# Patient Record
Sex: MALE | Race: ASIAN | NOT HISPANIC OR LATINO
[De-identification: names, ages, dates, MRNs, and addresses within clinical notes are randomized per-mention and may not be internally consistent; named-entity substitution may affect disease eponyms.]

---

## 2019-04-11 VITALS — HEIGHT: 63 IN

## 2019-04-11 DIAGNOSIS — N18.6 END STAGE RENAL DISEASE: ICD-10-CM

## 2019-04-11 DIAGNOSIS — T82.599A OTHER MECHANICAL COMPLICATION OF UNSPECIFIED CARDIAC AND VASCULAR DEVICES AND IMPLANTS, INITIAL ENCOUNTER: ICD-10-CM

## 2019-04-11 DIAGNOSIS — I10 ESSENTIAL (PRIMARY) HYPERTENSION: ICD-10-CM

## 2019-04-11 PROBLEM — Z00.00 ENCOUNTER FOR PREVENTIVE HEALTH EXAMINATION: Status: ACTIVE | Noted: 2019-04-11

## 2019-04-11 NOTE — ASSESSMENT
[FreeTextEntry1] : 49yoM w/ESRD on HD via L radiocephalic AVF, reports prolonged bleeding after HD sessions.  Will plan for LUE fistulogram/venoplasty in OR.

## 2019-04-11 NOTE — PHYSICAL EXAM
[JVD] : no jugular venous distention  [Normal Breath Sounds] : Normal breath sounds [Normal Thyroid] : the thyroid was normal [Normal Rate and Rhythm] : normal rate and rhythm [2+] : left 2+ [Alert] : alert [No Rash or Lesion] : No rash or lesion [Calm] : calm [FreeTextEntry1] : +thrill/bruit in LUE [de-identified] : NC/AT, anicteric [de-identified] : Well-nourished, NAD [de-identified] : FROM throughout, strength 5/5x4, no palpable cords in UEs

## 2019-04-11 NOTE — HISTORY OF PRESENT ILLNESS
[FreeTextEntry1] : 49yoM w/ESRD on HD via L radiocephalic AVF referred by Dr. Lino Cabezas for recent complaints of bleeding from his AVF needle sites.  Pt denies any issues during HD sessions, no other complaints w/the LUE.

## 2019-04-12 ENCOUNTER — APPOINTMENT (OUTPATIENT)
Dept: VASCULAR SURGERY | Facility: CLINIC | Age: 49
End: 2019-04-12

## 2019-04-12 ENCOUNTER — OUTPATIENT (OUTPATIENT)
Dept: OUTPATIENT SERVICES | Facility: HOSPITAL | Age: 49
LOS: 1 days | End: 2019-04-12
Payer: MEDICARE

## 2019-04-12 VITALS
OXYGEN SATURATION: 99 % | DIASTOLIC BLOOD PRESSURE: 59 MMHG | HEART RATE: 81 BPM | TEMPERATURE: 96 F | HEIGHT: 63 IN | RESPIRATION RATE: 16 BRPM | WEIGHT: 117.07 LBS | SYSTOLIC BLOOD PRESSURE: 117 MMHG

## 2019-04-12 DIAGNOSIS — Z41.9 ENCOUNTER FOR PROCEDURE FOR PURPOSES OTHER THAN REMEDYING HEALTH STATE, UNSPECIFIED: Chronic | ICD-10-CM

## 2019-04-12 DIAGNOSIS — N18.6 END STAGE RENAL DISEASE: ICD-10-CM

## 2019-04-12 LAB
BASE EXCESS BLDA CALC-SCNC: 6.4 MMOL/L — HIGH (ref -2–3)
CA-I BLDA-SCNC: 1.13 MMOL/L — SIGNIFICANT CHANGE UP (ref 1.12–1.3)
COHGB MFR BLDA: 5 % — HIGH
HCO3 BLDA-SCNC: 32 MMOL/L — HIGH (ref 21–28)
HGB BLDA-MCNC: 10.4 G/DL — LOW (ref 13–17)
METHGB MFR BLDA: 0.6 % — SIGNIFICANT CHANGE UP
O2 CT VFR BLDA CALC: SIGNIFICANT CHANGE UP (ref 15–23)
OXYHGB MFR BLDA: 47 % — LOW (ref 94–100)
PCO2 BLDA: 54 MMHG — HIGH (ref 35–48)
PH BLDA: 7.4 — SIGNIFICANT CHANGE UP (ref 7.35–7.45)
PO2 BLDA: 29 MMHG — CRITICAL LOW (ref 83–108)
POTASSIUM BLDA-SCNC: 5 MMOL/L — HIGH (ref 3.5–4.9)
SAO2 % BLDA: 50 % — LOW (ref 95–100)
SODIUM BLDA-SCNC: 139 MMOL/L — SIGNIFICANT CHANGE UP (ref 138–146)

## 2019-04-12 PROCEDURE — 84295 ASSAY OF SERUM SODIUM: CPT

## 2019-04-12 PROCEDURE — 84132 ASSAY OF SERUM POTASSIUM: CPT

## 2019-04-12 PROCEDURE — 85018 HEMOGLOBIN: CPT

## 2019-04-12 PROCEDURE — 82330 ASSAY OF CALCIUM: CPT

## 2019-04-19 ENCOUNTER — OUTPATIENT (OUTPATIENT)
Dept: INPATIENT UNIT | Facility: HOSPITAL | Age: 49
LOS: 1 days | Discharge: HOME | End: 2019-04-19
Payer: MEDICARE

## 2019-04-19 ENCOUNTER — APPOINTMENT (OUTPATIENT)
Dept: VASCULAR SURGERY | Facility: HOSPITAL | Age: 49
End: 2019-04-19

## 2019-04-19 VITALS
OXYGEN SATURATION: 97 % | WEIGHT: 115.52 LBS | HEIGHT: 67 IN | DIASTOLIC BLOOD PRESSURE: 67 MMHG | TEMPERATURE: 97 F | RESPIRATION RATE: 16 BRPM | SYSTOLIC BLOOD PRESSURE: 121 MMHG | HEART RATE: 75 BPM

## 2019-04-19 VITALS
TEMPERATURE: 98 F | DIASTOLIC BLOOD PRESSURE: 88 MMHG | OXYGEN SATURATION: 99 % | RESPIRATION RATE: 12 BRPM | SYSTOLIC BLOOD PRESSURE: 155 MMHG | HEART RATE: 76 BPM

## 2019-04-19 DIAGNOSIS — Z41.9 ENCOUNTER FOR PROCEDURE FOR PURPOSES OTHER THAN REMEDYING HEALTH STATE, UNSPECIFIED: Chronic | ICD-10-CM

## 2019-04-19 LAB — POTASSIUM BLDV-SCNC: 4.6 MMOL/L — SIGNIFICANT CHANGE UP (ref 3.5–4.9)

## 2019-04-19 PROCEDURE — 84132 ASSAY OF SERUM POTASSIUM: CPT

## 2019-04-19 PROCEDURE — C1769: CPT

## 2019-04-19 PROCEDURE — 76000 FLUOROSCOPY <1 HR PHYS/QHP: CPT

## 2019-04-19 PROCEDURE — 36906 THRMBC/NFS DIALYSIS CIRCUIT: CPT

## 2019-04-19 PROCEDURE — C1725: CPT

## 2019-04-19 PROCEDURE — C1894: CPT

## 2019-04-19 PROCEDURE — 36902 INTRO CATH DIALYSIS CIRCUIT: CPT | Mod: GC

## 2019-04-19 PROCEDURE — C1887: CPT

## 2019-04-19 RX ORDER — LABETALOL HCL 100 MG
10 TABLET ORAL ONCE
Qty: 0 | Refills: 0 | Status: DISCONTINUED | OUTPATIENT
Start: 2019-04-19 | End: 2019-04-19

## 2019-04-19 RX ORDER — ACETAMINOPHEN 500 MG
650 TABLET ORAL ONCE
Qty: 0 | Refills: 0 | Status: DISCONTINUED | OUTPATIENT
Start: 2019-04-19 | End: 2019-04-26

## 2019-04-19 NOTE — BRIEF OPERATIVE NOTE - OPERATION/FINDINGS
LUE radiocephalic AVF with hx of excessive bleeding with HD 2/2 presumed central stenosis. Fistulogram revealing central stenosis and several points of stenosis within previously placed stents. Balloon fistuloplasty with 8x80 and 10x40 with improved flow on completion venogram. Improved thrill, hemostasis achieved, and palpable radial pulse at end of case.

## 2019-04-19 NOTE — PACU DISCHARGE NOTE - COMMENTS
Pt received dc instructions in Cantonese by Jeana [phone] via translation phone. Vss. Wife at bs to take pt home. Pt through teach back with the [employer]  verbalized instructions. Pt ate muffins and juice. Tolerated well.

## 2019-04-24 DIAGNOSIS — T82.868A THROMBOSIS DUE TO VASCULAR PROSTHETIC DEVICES, IMPLANTS AND GRAFTS, INITIAL ENCOUNTER: ICD-10-CM

## 2019-04-24 DIAGNOSIS — N18.6 END STAGE RENAL DISEASE: ICD-10-CM

## 2019-04-24 DIAGNOSIS — Z86.19 PERSONAL HISTORY OF OTHER INFECTIOUS AND PARASITIC DISEASES: ICD-10-CM
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
## 2019-04-24 DIAGNOSIS — F17.200 NICOTINE DEPENDENCE, UNSPECIFIED, UNCOMPLICATED: ICD-10-CM

## 2019-04-24 DIAGNOSIS — I12.0 HYPERTENSIVE CHRONIC KIDNEY DISEASE WITH STAGE 5 CHRONIC KIDNEY DISEASE OR END STAGE RENAL DISEASE: ICD-10-CM

## 2019-04-26 PROBLEM — M10.9 GOUT, UNSPECIFIED: Chronic | Status: ACTIVE | Noted: 2019-04-11

## 2019-04-26 PROBLEM — I10 ESSENTIAL (PRIMARY) HYPERTENSION: Chronic | Status: ACTIVE | Noted: 2019-04-11

## 2019-04-26 PROBLEM — N18.6 END STAGE RENAL DISEASE: Chronic | Status: ACTIVE | Noted: 2019-04-11

## 2020-02-19 NOTE — PATIENT PROFILE ADULT - NSPROGENARRIVEDFROM_GEN_A_NUR
CERTIFICATE OF SCHOOL and WORK    February 19, 2020      Re: Rigo Tanner  [address] [facility]  [address] [address]      This is to certify that Rigo Tanner has been under my care from 2/19/2020 and is excused from work and school 2/18/2020 thru 2/20/2020. May return to work and school on 2/21/2020. SIGNATURE:___________________________________________,   2/19/2020  Ryan Pacheco PA-C        [place] [address] [address]  [facility] [place]  [phone]
home

## 2023-06-28 NOTE — ASU PATIENT PROFILE, ADULT - INTERPRETER'S NAME
[Well Developed] : well developed [Well Nourished] : well nourished [No Acute Distress] : no acute distress [Normal Conjunctiva] : normal conjunctiva [Normal Venous Pressure] : normal venous pressure [No Carotid Bruit] : no carotid bruit [Normal S1, S2] : normal S1, S2 [No Murmur] : no murmur [No Rub] : no rub [No Gallop] : no gallop [Clear Lung Fields] : clear lung fields [Good Air Entry] : good air entry [No Respiratory Distress] : no respiratory distress  [Soft] : abdomen soft [Non Tender] : non-tender [Normal Gait] : normal gait [No Edema] : no edema [No Cyanosis] : no cyanosis [No Rash] : no rash [No Skin Lesions] : no skin lesions [Moves all extremities] : moves all extremities [No Focal Deficits] : no focal deficits [Normal Speech] : normal speech [Alert and Oriented] : alert and oriented yair

## 2023-07-25 NOTE — PATIENT PROFILE ADULT - SURGICAL SITE INCISION
no Composite Graft Text: The defect edges were debeveled with a #15 scalpel blade. Given the location of the defect, shape of the defect, the proximity to free margins and the fact the defect was full thickness a composite graft was deemed most appropriate.  The defect was outline and then transferred to the donor site.  A full thickness graft was then excised from the donor site. The graft was then placed in the primary defect, oriented appropriately and then sutured into place.  The secondary defect was then repaired using a primary closure.